# Patient Record
Sex: MALE | Race: WHITE
[De-identification: names, ages, dates, MRNs, and addresses within clinical notes are randomized per-mention and may not be internally consistent; named-entity substitution may affect disease eponyms.]

---

## 2021-02-22 ENCOUNTER — HOSPITAL ENCOUNTER (EMERGENCY)
Dept: HOSPITAL 53 - M ED | Age: 31
Discharge: HOME | End: 2021-02-22
Payer: COMMERCIAL

## 2021-02-22 VITALS — DIASTOLIC BLOOD PRESSURE: 79 MMHG | SYSTOLIC BLOOD PRESSURE: 127 MMHG

## 2021-02-22 VITALS — BODY MASS INDEX: 40.43 KG/M2 | HEIGHT: 74 IN | WEIGHT: 315 LBS

## 2021-02-22 DIAGNOSIS — Z98.84: ICD-10-CM
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
## 2021-02-22 DIAGNOSIS — S39.012A: Primary | ICD-10-CM

## 2021-02-22 DIAGNOSIS — M51.26: ICD-10-CM
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
## 2021-02-22 DIAGNOSIS — M54.16: ICD-10-CM

## 2021-02-22 DIAGNOSIS — M51.36: ICD-10-CM
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
## 2021-02-22 DIAGNOSIS — M48.061: ICD-10-CM

## 2021-02-22 NOTE — ED PDOC
Post-Departure Follow-Up


mri ls spine faxed to dr hudson for fu mlg Lundborg-Gray,Maja MD          Feb 22, 2021 20:06

## 2021-02-22 NOTE — REPVR
PROCEDURE INFORMATION: 

Exam: MR Lumbar Spine Without Contrast 

Exam date and time: 2/22/2021 4:50 PM 

Age: 30 years old 

Clinical indication: Low back pain; Additional info: Low back pain with 

reported saddle anesthesia 



TECHNIQUE: 

Imaging protocol: Multiplanar magnetic resonance images of the lumbar spine 

without intravenous contrast. 



COMPARISON: 

CR Spine. Lumbosacral, complete 2/22/2021 2:30 PM 



FINDINGS: 

Vertebrae: Anatomic alignment. No acute fracture seen. The AP canal diameter is 

diminished a developmental basis due to shortened pedicles. 

Spinal cord: The conus medullaris ends normally. 

There is disc desiccation at T12-L1, L1-L2 and L4-L5. 

L1-L2: 3 mm left paracentral disc protrusion does not contribute to significant 

central spinal canal stenosis. The neural foramina are patent. 

L2-L3:  No significant disc disease. No significant spinal canal stenosis. No 

neural foraminal stenosis. 

L3-L4: Mild facet arthropathy. No stenoses. 

L4-L5: Mild diffuse disc bulge and facet arthropathy. A 3-4 mm central disc 

extrusion with high-intensity zone extends 4 mm above the disc space. Central 

spinal canal stenosis is mild, in part on a developmental basis. The lateral 

recesses are narrowed, disc material contacting the bilateral L5 nerve roots.  

Mild left neural foraminal stenosis.  No significant right neural foraminal 

narrowing.

L5-S1: Mild facet arthropathy. No stenoses. 



Soft tissues: Nonspecific edema in the back subcutaneous fat, potentially 

dependent/positional. 



IMPRESSION: 

A small central disc extrusion at L4-L5 may be cause of L5 distribution 

radiculopathy.



Electronically signed by: Melonie Calhoun On 02/22/2021  19:18:35 PM

## 2021-02-22 NOTE — REP
INDICATION:

SEVERE PAIN/INJURY.



COMPARISON:

None.



TECHNIQUE:

Seven views of the lumbar spine are obtained including flexion extension lateral views.



FINDINGS:

Lumbar vertebral body heights are preserved.  Alignment is normal.  There is no

fracture or collapse seen.  At L1-2, there is mild disc space narrowing and anterior

discogenic spurring.  There is mild disc narrowing at L4-5.  There is no evidence of

spondylolysis or spondylolisthesis.  No subluxation or instability is seen on flexion

extension lateral views.  Facets are normally aligned.  Pedicles and posterior

elements are intact.  Psoas margins are symmetric.  Sacrum and SI joints are

unremarkable.



IMPRESSION:

Degenerative disc disease changes at L4-5 and L1-2.  No fracture, subluxation, or

instability seen.





<Electronically signed by Balaji Montez > 02/22/21 [id number]